# Patient Record
Sex: FEMALE | Race: BLACK OR AFRICAN AMERICAN | NOT HISPANIC OR LATINO | Employment: UNEMPLOYED | URBAN - METROPOLITAN AREA
[De-identification: names, ages, dates, MRNs, and addresses within clinical notes are randomized per-mention and may not be internally consistent; named-entity substitution may affect disease eponyms.]

---

## 2024-04-11 DIAGNOSIS — I10 PRIMARY HYPERTENSION: Primary | ICD-10-CM

## 2024-04-12 RX ORDER — AMLODIPINE BESYLATE 5 MG/1
5 TABLET ORAL DAILY
Qty: 30 TABLET | Refills: 0 | Status: SHIPPED | OUTPATIENT
Start: 2024-04-12 | End: 2024-05-07

## 2024-05-06 DIAGNOSIS — I10 PRIMARY HYPERTENSION: ICD-10-CM

## 2024-05-07 RX ORDER — AMLODIPINE BESYLATE 5 MG/1
5 TABLET ORAL DAILY
Qty: 30 TABLET | Refills: 0 | Status: SHIPPED | OUTPATIENT
Start: 2024-05-07 | End: 2024-05-22

## 2024-05-21 DIAGNOSIS — I10 PRIMARY HYPERTENSION: ICD-10-CM

## 2024-05-22 RX ORDER — AMLODIPINE BESYLATE 5 MG/1
5 TABLET ORAL DAILY
Qty: 90 TABLET | Refills: 0 | Status: SHIPPED | OUTPATIENT
Start: 2024-05-22

## 2024-08-22 DIAGNOSIS — I10 PRIMARY HYPERTENSION: ICD-10-CM

## 2024-08-22 RX ORDER — AMLODIPINE BESYLATE 5 MG/1
5 TABLET ORAL DAILY
Qty: 30 TABLET | Refills: 0 | Status: SHIPPED | OUTPATIENT
Start: 2024-08-22 | End: 2024-08-23

## 2024-08-22 ASSESSMENT — PROMIS GLOBAL HEALTH SCALE
CARRYOUT_SOCIAL_ACTIVITIES: GOOD
RATE_SOCIAL_SATISFACTION: GOOD
RATE_PHYSICAL_HEALTH: POOR
RATE_AVERAGE_PAIN: 2
CARRYOUT_PHYSICAL_ACTIVITIES: MODERATELY
RATE_MENTAL_HEALTH: VERY GOOD
RATE_GENERAL_HEALTH: FAIR
EMOTIONAL_PROBLEMS: RARELY
RATE_AVERAGE_FATIGUE: MODERATE
RATE_QUALITY_OF_LIFE: GOOD

## 2024-08-23 ENCOUNTER — APPOINTMENT (OUTPATIENT)
Dept: PRIMARY CARE | Facility: CLINIC | Age: 39
End: 2024-08-23
Payer: COMMERCIAL

## 2024-08-23 VITALS
WEIGHT: 178 LBS | OXYGEN SATURATION: 98 % | HEART RATE: 108 BPM | DIASTOLIC BLOOD PRESSURE: 96 MMHG | BODY MASS INDEX: 28.61 KG/M2 | HEIGHT: 66 IN | SYSTOLIC BLOOD PRESSURE: 120 MMHG

## 2024-08-23 DIAGNOSIS — Z00.00 ANNUAL PHYSICAL EXAM: Primary | ICD-10-CM

## 2024-08-23 DIAGNOSIS — I10 PRIMARY HYPERTENSION: ICD-10-CM

## 2024-08-23 DIAGNOSIS — E78.5 HYPERLIPIDEMIA, UNSPECIFIED HYPERLIPIDEMIA TYPE: ICD-10-CM

## 2024-08-23 PROCEDURE — 1036F TOBACCO NON-USER: CPT | Performed by: SPECIALIST

## 2024-08-23 PROCEDURE — 3074F SYST BP LT 130 MM HG: CPT | Performed by: SPECIALIST

## 2024-08-23 PROCEDURE — 99395 PREV VISIT EST AGE 18-39: CPT | Performed by: SPECIALIST

## 2024-08-23 PROCEDURE — 3008F BODY MASS INDEX DOCD: CPT | Performed by: SPECIALIST

## 2024-08-23 PROCEDURE — 3080F DIAST BP >= 90 MM HG: CPT | Performed by: SPECIALIST

## 2024-08-23 RX ORDER — BUTYROSPERMUM PARKII(SHEA BUTTER), SIMMONDSIA CHINENSIS (JOJOBA) SEED OIL, ALOE BARBADENSIS LEAF EXTRACT .01; 1; 3.5 G/100G; G/100G; G/100G
250 LIQUID TOPICAL DAILY
COMMUNITY

## 2024-08-23 RX ORDER — BISMUTH SUBSALICYLATE 262 MG
1 TABLET,CHEWABLE ORAL DAILY
COMMUNITY

## 2024-08-23 RX ORDER — AMLODIPINE BESYLATE 5 MG/1
5 TABLET ORAL DAILY
Qty: 90 TABLET | Refills: 1 | Status: SHIPPED | OUTPATIENT
Start: 2024-08-23

## 2024-08-23 RX ORDER — AMLODIPINE BESYLATE 2.5 MG/1
2.5 TABLET ORAL DAILY
Qty: 90 TABLET | Refills: 1 | Status: SHIPPED | OUTPATIENT
Start: 2024-08-23 | End: 2025-02-19

## 2024-08-23 RX ORDER — DEXTROMETHORPHAN HYDROBROMIDE, GUAIFENESIN 5; 100 MG/5ML; MG/5ML
650 LIQUID ORAL EVERY 8 HOURS PRN
COMMUNITY

## 2024-08-23 RX ORDER — AMLODIPINE BESYLATE 5 MG/1
5 TABLET ORAL DAILY
Qty: 30 TABLET | Refills: 0 | Status: SHIPPED | OUTPATIENT
Start: 2024-08-23 | End: 2024-08-23 | Stop reason: SDUPTHER

## 2024-08-23 NOTE — PROGRESS NOTES
"Subjective   Patient ID: Bee Ndiaye is a 38 y.o. female who presents for Annual Exam.  HPI    37 yo left-handed female Pmhx sig for HTN, Fam Hx Htn and fam hx Sjogrens (Mother) here today for CPE    Not following DASH diet   Eating not as healthy    some joint pain right foot using compression sock \"soreness when she walks\"    No red/hot swollen or stiffnes >30 mins morning  Left wrist bothered her 2 yrs ago used brace now fine    No Known Allergies   Current Outpatient Medications   Medication Instructions    acetaminophen (TYLENOL 8 HOUR) 650 mg, oral, Every 8 hours PRN, Do not crush, chew, or split.    amLODIPine (NORVASC) 2.5 mg, oral, Daily, To be taken along with amlodipine 5 mg daily for total daily dose of 5 mg    amLODIPine (NORVASC) 5 mg, oral, Daily, To be taken along with 2.5 mg daily for total daily dose of 7.5 mg    multivitamin tablet 1 tablet, oral, Daily    saccharomyces boulardii (FLORASTOR) 250 mg, oral, Daily        Review of Systems  Constitutional  No fatigue, no fevers, no chills, no unintentional weight loss  HEENT:  No headaches, no dizziness, no double vision, no blurred vision, eye exams current (glasses for driving is nearsighted) no hearing loss  Cardiovascular:  No chest pain, no palpitations, no shortness of breath with exertion (one flight of stairs),   Respiratory:  No cough, no hemoptysis, no wheezing, No shortness of breath at rest  GI:  No dysphagia, no odynophagia, no reflux, no abdominal pain, no nausea, no vomiting, no changes in bowel habits, no bright red blood per rectum, no melena  :  No urinary frequency, no dysuria, no urine incontinence  MSK:  No falls, Over past year some joint pain right foot using compression sock \"soreness when she walks\"    pain, no joint swelling  Neuro:  No tremors, no extremity weakness, no changes in sensation  Breasts:  No abnormalities on self breast exam no nipple discharge no skin changes    Physical Exam  BP (!) 120/96   Pulse 108   Ht " "1.676 m (5' 6\")   Wt 80.7 kg (178 lb)   SpO2 98%   BMI 28.73 kg/m²   General:    Well-appearing  F in no acute distress, well nourished, well hydrated  Head:  Normocephalic, atraumatic  Skin:          Warm dry,   Eyes:  Anicteric sclera, pupils equal,   Ears:        TMs intact on right, left obscured by cerumen  Oral:      Not examined due to pandemic  Neck:   Supple, no cervical/supraclavicular adenopathy, no thyromegaly or nodules appreciated on exam  Cor:      Regular rate, normal S1, S2, no murmurs appreciated, no S3, no S4   Lungs:   Clear to auscultation b/l, no wheezes, no rhonchi, no crackles, no accessory respiratory muscle use  Abd:          Soft, nontender, no guarding, no rebound, no hepatosplenomegaly appreciated   Ext:            No lower extremity edema, no palpable cords  Pulses:      Pedal pulses intact  Neuro:   CN2-12 grossly intact (except funduscopic exam not performed)  Breasts:     No Axillary adenopathy, no discrete palpable breast nodules, no overlying skin changes, no nipple discharge    Assessment/Plan     Annual Physical Exam  -Recommend annual influenza vaccine in fall  -Recommend covid vaccine in fall (2 prior covid vaccines, no hx Covid)  -Prior Tdap 1/25/2023  -Recommend annual gynecology exam (last pap 2018)  -Plan Colon cancer screening age 45 absent changes  -BMI 28   -Labs ordered CMP CBC Fx Lipids    Htn  -Suboptimally controlled on 5 mg amlodipine  -BP Today 130/00 on Left and 120/96 on right  -Home bp 136/95  -Will increase amlodipine to 7.5 mg daily  -Recommend home BP monitoring  -If diastolic remains elevated above 90, will need to consider diuretic, discussed    Hyperlipidemia  - last year  -Recommend heart healthy diet/exercise as tolerated  -Labs ordered    F/up 2-3 mos due to htn         Karissa Mitchell DO   "

## 2024-08-26 RX ORDER — AMLODIPINE BESYLATE 5 MG/1
5 TABLET ORAL DAILY
Qty: 30 TABLET | Refills: 0 | OUTPATIENT
Start: 2024-08-26

## 2024-12-06 ENCOUNTER — APPOINTMENT (OUTPATIENT)
Dept: PRIMARY CARE | Facility: CLINIC | Age: 39
End: 2024-12-06
Payer: COMMERCIAL

## 2024-12-09 ENCOUNTER — APPOINTMENT (OUTPATIENT)
Dept: OBSTETRICS AND GYNECOLOGY | Facility: CLINIC | Age: 39
End: 2024-12-09
Payer: COMMERCIAL

## 2024-12-09 VITALS
WEIGHT: 174 LBS | HEIGHT: 66 IN | SYSTOLIC BLOOD PRESSURE: 129 MMHG | DIASTOLIC BLOOD PRESSURE: 83 MMHG | BODY MASS INDEX: 27.97 KG/M2

## 2024-12-09 DIAGNOSIS — Z01.419 NORMAL GYNECOLOGIC EXAMINATION: Primary | ICD-10-CM

## 2024-12-09 PROCEDURE — 3074F SYST BP LT 130 MM HG: CPT | Performed by: OBSTETRICS & GYNECOLOGY

## 2024-12-09 PROCEDURE — 3079F DIAST BP 80-89 MM HG: CPT | Performed by: OBSTETRICS & GYNECOLOGY

## 2024-12-09 PROCEDURE — 99395 PREV VISIT EST AGE 18-39: CPT | Performed by: OBSTETRICS & GYNECOLOGY

## 2024-12-09 PROCEDURE — 88175 CYTOPATH C/V AUTO FLUID REDO: CPT

## 2024-12-09 PROCEDURE — 1036F TOBACCO NON-USER: CPT | Performed by: OBSTETRICS & GYNECOLOGY

## 2024-12-09 PROCEDURE — 3008F BODY MASS INDEX DOCD: CPT | Performed by: OBSTETRICS & GYNECOLOGY

## 2024-12-09 PROCEDURE — 87624 HPV HI-RISK TYP POOLED RSLT: CPT

## 2024-12-09 ASSESSMENT — PATIENT HEALTH QUESTIONNAIRE - PHQ9
SUM OF ALL RESPONSES TO PHQ9 QUESTIONS 1 & 2: 0
2. FEELING DOWN, DEPRESSED OR HOPELESS: NOT AT ALL
1. LITTLE INTEREST OR PLEASURE IN DOING THINGS: NOT AT ALL

## 2024-12-09 ASSESSMENT — ENCOUNTER SYMPTOMS
CARDIOVASCULAR NEGATIVE: 1
GASTROINTESTINAL NEGATIVE: 1
EYES NEGATIVE: 1
NEUROLOGICAL NEGATIVE: 1
HEMATOLOGIC/LYMPHATIC NEGATIVE: 1
CONSTITUTIONAL NEGATIVE: 1
ALLERGIC/IMMUNOLOGIC NEGATIVE: 1
PSYCHIATRIC NEGATIVE: 1
RESPIRATORY NEGATIVE: 1
ENDOCRINE NEGATIVE: 1
MUSCULOSKELETAL NEGATIVE: 1

## 2024-12-09 ASSESSMENT — PAIN SCALES - GENERAL: PAINLEVEL_OUTOF10: 0-NO PAIN

## 2024-12-09 NOTE — PROGRESS NOTES
Subjective   Patient ID: Bee Ndiaye is a 39 y.o. female who presents for Annual Exam (Last pap:  10/9/2018  neg/neg./Declines chaperone.   Daija Barriga LPN).  HPI patient is here for annual visit she has no complaints    Review of Systems   Constitutional: Negative.    Eyes: Negative.    Respiratory: Negative.     Cardiovascular: Negative.    Gastrointestinal: Negative.    Endocrine: Negative.    Genitourinary: Negative.    Musculoskeletal: Negative.    Skin: Negative.    Allergic/Immunologic: Negative.    Neurological: Negative.    Hematological: Negative.    Psychiatric/Behavioral: Negative.         Objective   Physical Exam  Constitutional:       Appearance: Normal appearance.   HENT:      Head: Normocephalic and atraumatic.   Cardiovascular:      Rate and Rhythm: Normal rate and regular rhythm.      Pulses: Normal pulses.      Heart sounds: Normal heart sounds.   Pulmonary:      Effort: Pulmonary effort is normal.      Breath sounds: Normal breath sounds.   Abdominal:      General: Abdomen is flat. Bowel sounds are normal.      Palpations: Abdomen is soft.      Hernia: There is no hernia in the left inguinal area or right inguinal area.   Genitourinary:     General: Normal vulva.      Exam position: Lithotomy position.      Labia:         Right: No rash, tenderness or lesion.         Left: No rash, tenderness or lesion.       Urethra: No prolapse.      Vagina: Normal.      Cervix: Normal.      Uterus: Normal.       Adnexa: Right adnexa normal and left adnexa normal.   Musculoskeletal:      Cervical back: Normal range of motion and neck supple.   Skin:     General: Skin is warm and dry.   Neurological:      General: No focal deficit present.      Mental Status: She is alert and oriented to person, place, and time.         Assessment/Plan    normal gynecologic examination  Pap test HPV typing         Truong Duncan MD 12/09/24 9:26 AM

## 2024-12-19 LAB
CYTOLOGY CMNT CVX/VAG CYTO-IMP: NORMAL
HPV HR 12 DNA GENITAL QL NAA+PROBE: NEGATIVE
HPV HR GENOTYPES PNL CVX NAA+PROBE: NEGATIVE
HPV16 DNA SPEC QL NAA+PROBE: NEGATIVE
HPV18 DNA SPEC QL NAA+PROBE: NEGATIVE
LAB AP CONTRACEPTIVE HISTORY: NORMAL
LAB AP HPV GENOTYPE QUESTION: YES
LAB AP HPV HR: NORMAL
LABORATORY COMMENT REPORT: NORMAL
LMP START DATE: NORMAL
PATH REPORT.TOTAL CANCER: NORMAL

## 2025-02-10 ENCOUNTER — APPOINTMENT (OUTPATIENT)
Dept: OBSTETRICS AND GYNECOLOGY | Facility: CLINIC | Age: 40
End: 2025-02-10
Payer: COMMERCIAL

## 2025-02-10 VITALS
WEIGHT: 174.2 LBS | HEIGHT: 66 IN | BODY MASS INDEX: 28 KG/M2 | DIASTOLIC BLOOD PRESSURE: 90 MMHG | SYSTOLIC BLOOD PRESSURE: 142 MMHG

## 2025-02-10 DIAGNOSIS — N76.0 ACUTE VAGINITIS: Primary | ICD-10-CM

## 2025-02-10 DIAGNOSIS — Z97.5 IUD (INTRAUTERINE DEVICE) IN PLACE: ICD-10-CM

## 2025-02-10 PROCEDURE — 3077F SYST BP >= 140 MM HG: CPT | Performed by: ADVANCED PRACTICE MIDWIFE

## 2025-02-10 PROCEDURE — 99213 OFFICE O/P EST LOW 20 MIN: CPT | Performed by: ADVANCED PRACTICE MIDWIFE

## 2025-02-10 PROCEDURE — 3008F BODY MASS INDEX DOCD: CPT | Performed by: ADVANCED PRACTICE MIDWIFE

## 2025-02-10 PROCEDURE — 3080F DIAST BP >= 90 MM HG: CPT | Performed by: ADVANCED PRACTICE MIDWIFE

## 2025-02-10 PROCEDURE — 1036F TOBACCO NON-USER: CPT | Performed by: ADVANCED PRACTICE MIDWIFE

## 2025-02-10 RX ORDER — METRONIDAZOLE 500 MG/1
500 TABLET ORAL 2 TIMES DAILY
Qty: 14 TABLET | Refills: 0 | Status: SHIPPED | OUTPATIENT
Start: 2025-02-10 | End: 2025-02-17

## 2025-02-10 RX ORDER — TERCONAZOLE 8 MG/G
1 CREAM VAGINAL NIGHTLY
Qty: 20 G | Refills: 0 | Status: SHIPPED | OUTPATIENT
Start: 2025-02-10 | End: 2025-02-13

## 2025-02-10 ASSESSMENT — ENCOUNTER SYMPTOMS
DEPRESSION: 0
MUSCULOSKELETAL NEGATIVE: 0
GASTROINTESTINAL NEGATIVE: 0
NEUROLOGICAL NEGATIVE: 0
OCCASIONAL FEELINGS OF UNSTEADINESS: 0
HEMATOLOGIC/LYMPHATIC NEGATIVE: 0
ALLERGIC/IMMUNOLOGIC NEGATIVE: 0
RESPIRATORY NEGATIVE: 0
PSYCHIATRIC NEGATIVE: 0
CONSTITUTIONAL NEGATIVE: 0
ENDOCRINE NEGATIVE: 0
CARDIOVASCULAR NEGATIVE: 0
EYES NEGATIVE: 0
LOSS OF SENSATION IN FEET: 0

## 2025-02-10 ASSESSMENT — PAIN SCALES - GENERAL: PAINLEVEL_OUTOF10: 0-NO PAIN

## 2025-02-10 NOTE — PROGRESS NOTES
Subjective   Patient ID: Bee Ndiaye is a 39 y.o. female who presents for Vaginitis/Bacterial Vaginosis.  HPI  Vaginal itching  Noticed it last Monday  Took monistat 7day course without any relief  Feels about the same    IUD    Review of Systems   Genitourinary:         Vaginal itching   All other systems reviewed and are negative.      Objective   Physical Exam  Constitutional:       Appearance: Normal appearance. She is normal weight.   Genitourinary:     General: Normal vulva.      Labia:         Right: No rash, tenderness, lesion or injury.         Left: No rash, tenderness, lesion or injury.       Vagina: Bleeding present.      Cervix: Cervical bleeding present. No cervical motion tenderness, discharge, friability, lesion, erythema or eversion.      Uterus: Not deviated, not enlarged, not fixed, not tender and no uterine prolapse.       Comments: On menses  Strings visible in os        Assessment/Plan   Problem List Items Addressed This Visit             ICD-10-CM    IUD (intrauterine device) in place Z97.5     Other Visit Diagnoses         Codes    Acute vaginitis    -  Primary N76.0    Relevant Medications    terconazole (Terazol 3) 0.8 % vaginal cream    metroNIDAZOLE (Flagyl) 500 mg tablet    Other Relevant Orders    Vaginitis Gram Stain For Bacterial Vaginosis + Yeast                 VANDA Medina 02/10/25 12:36 PM

## 2025-02-10 NOTE — PATIENT INSTRUCTIONS
Improving your microbiome--the collection of bacteria, fungi, viruses, and other microbes living in and on your body--can have a significant positive impact on your overall health, including digestion, immune function, mental health, and skin health. Here are some general instructions you can follow to improve the health of your microbiome:    1. Eat a Diverse Diet  A varied diet supports a diverse microbiome. The more different types of foods you eat, the more types of beneficial microbes you can support. Focus on:    Fiber-rich foods: Vegetables, fruits, whole grains, legumes, and nuts. Fiber acts as food for the beneficial bacteria.  Fermented foods: Yogurt, kefir, sauerkraut, kimchi, miso, kombucha, and other fermented foods contain live beneficial bacteria that can help boost your gut health.  Prebiotic foods: These foods, such as onions, garlic, leeks, asparagus, bananas, and oats, provide food for the good bacteria in your gut.  Polyphenol-rich foods: Berries, dark chocolate, green tea, and nuts are rich in polyphenols, which are plant compounds that feed beneficial gut bacteria.    2. Include Probiotic Supplements  Probiotics are live bacteria that can help replenish beneficial microbes in the gut. While probiotics are found in foods like yogurt and fermented foods, you can also take probiotic supplements, especially if you're recovering from an illness, taking antibiotics, or dealing with a gut imbalance.  Consult with your healthcare provider to determine the right type and dosage for your needs.    3. Minimize Use of Antibiotics (When Possible)  Antibiotics are effective for treating bacterial infections, but they also kill beneficial bacteria in the gut. Overuse or misuse of antibiotics can disrupt the balance of your microbiome. Only use antibiotics when prescribed by a doctor and always finish the full course to avoid resistance.    4. Limit Sugar and Artificial Sweeteners  High sugar consumption and  artificial sweeteners can negatively affect the diversity of your microbiome and promote the growth of harmful bacteria. Try to limit your intake of sugary foods and drinks, as well as processed foods that may contain artificial sweeteners.    5. Avoid Smoking and Limit Alcohol  Smoking: Tobacco can harm the balance of bacteria in your gut, reducing diversity and promoting harmful bacteria. Quitting smoking is one of the best ways to improve your microbiome.  Alcohol: Excessive alcohol intake can disrupt the gut microbiota and lead to inflammation. If you drink, do so in moderation.    6. Exercise Regularly  Regular physical activity is linked to a more diverse and healthy microbiome. Even moderate exercise, like walking or cycling, can promote the growth of beneficial bacteria. Aim for at least 150 minutes of moderate-intensity exercise per week.    7. Get Enough Sleep  Quality sleep is essential for overall health, including the health of your microbiome. Poor sleep patterns can disrupt the balance of gut bacteria, which may lead to gut-related health issues. Aim for 7-9 hours of sleep each night.  Practice good sleep hygiene by establishing a regular sleep schedule and creating a comfortable, dark, and quiet sleep environment.    8. Manage Stress  Chronic stress can alter the gut microbiome and contribute to gastrointestinal issues. Find ways to manage stress, such as:  Mindfulness meditation  Yoga  Breathing exercises  Spending time outdoors    9. Stay Hydrated  Drinking enough water helps maintain the mucosal lining of the intestines, which supports healthy gut function. Aim for 8 cups (about 2 liters) of water per day, but your needs may vary depending on activity levels and climate.    10. Avoid Overuse of Antibacterial Products  Antibacterial soaps, lotions, and other products can kill beneficial bacteria on your skin and potentially lead to an imbalance. It’s important to practice good hygiene without  over-relying on antibacterial products.    11. Consider Prebiotic and Probiotic Foods  Prebiotics: Found in foods like garlic, onions, leeks, and bananas, prebiotics support the growth of beneficial bacteria in your gut.  Probiotics: Consuming fermented foods such as yogurt, kimchi, and sauerkraut provides live cultures of beneficial bacteria.    12. Consult Your Healthcare Provider  If you have ongoing digestive problems, autoimmune conditions, or other health concerns, consider consulting with a healthcare provider, such as a nutritionist or gastroenterologist. They can help guide you in improving your gut health based on your unique situation.    By following these steps, you can support a balanced, diverse, and thriving microbiome, which in turn can enhance your overall health and wellbeing.

## 2025-02-11 LAB — BV SCORE VAG QL: NORMAL

## 2025-02-16 DIAGNOSIS — I10 PRIMARY HYPERTENSION: ICD-10-CM

## 2025-02-17 RX ORDER — AMLODIPINE BESYLATE 5 MG/1
TABLET ORAL
Qty: 90 TABLET | Refills: 0 | Status: SHIPPED | OUTPATIENT
Start: 2025-02-17

## 2025-02-17 RX ORDER — AMLODIPINE BESYLATE 2.5 MG/1
TABLET ORAL
Qty: 90 TABLET | Refills: 0 | Status: SHIPPED | OUTPATIENT
Start: 2025-02-17

## 2025-02-28 ENCOUNTER — APPOINTMENT (OUTPATIENT)
Dept: PRIMARY CARE | Facility: CLINIC | Age: 40
End: 2025-02-28
Payer: COMMERCIAL

## 2025-02-28 VITALS — HEART RATE: 90 BPM | OXYGEN SATURATION: 96 % | SYSTOLIC BLOOD PRESSURE: 122 MMHG | DIASTOLIC BLOOD PRESSURE: 90 MMHG

## 2025-02-28 DIAGNOSIS — E78.5 HYPERLIPIDEMIA, UNSPECIFIED HYPERLIPIDEMIA TYPE: ICD-10-CM

## 2025-02-28 DIAGNOSIS — R39.9 UTI SYMPTOMS: Primary | ICD-10-CM

## 2025-02-28 DIAGNOSIS — N64.4 BREAST PAIN: ICD-10-CM

## 2025-02-28 DIAGNOSIS — I10 ESSENTIAL HYPERTENSION: ICD-10-CM

## 2025-02-28 PROCEDURE — 3080F DIAST BP >= 90 MM HG: CPT | Performed by: SPECIALIST

## 2025-02-28 PROCEDURE — 99214 OFFICE O/P EST MOD 30 MIN: CPT | Performed by: SPECIALIST

## 2025-02-28 PROCEDURE — 3074F SYST BP LT 130 MM HG: CPT | Performed by: SPECIALIST

## 2025-02-28 RX ORDER — AMLODIPINE BESYLATE 10 MG/1
10 TABLET ORAL DAILY
Qty: 90 TABLET | Refills: 1 | Status: SHIPPED | OUTPATIENT
Start: 2025-02-28 | End: 2025-08-27

## 2025-02-28 NOTE — ASSESSMENT & PLAN NOTE
Currently taking 7.5 mg amlodipine  Does not want to take diuretic  Will increase amlodipine to 10 mg daily  Home bp this morning 123/89  /90 for me today   Follow-up in 4-6 months for bp check    Orders:    amLODIPine (Norvasc) 10 mg tablet; Take 1 tablet (10 mg) by mouth once daily.

## 2025-02-28 NOTE — PROGRESS NOTES
Subjective   Patient ID: Bee Ndiaye is a 39 y.o. female who presents for No chief complaint on file..  HPI    39 yo L Handed female Pmhx sig for Htn, Fam Hx Htn, Fam Hx Sjogrens (mother) presents today for f/up    Work related stress  Feeling anxious today, discussed    Saw ob/gyn regarding bacterial infection  Wasn't yeast infection  and not bacterial vaginosis    Thinks may have UTI, a little bit of frequency no burning with urination and urine is cloudy and bladder feels like she has to void but doesn't    Breast tenderness comes goes x 1 week both.  Last menses started normal cycle     No Known Allergies   Current Outpatient Medications   Medication Instructions    acetaminophen (TYLENOL 8 HOUR) 650 mg, Every 8 hours PRN    amLODIPine (NORVASC) 10 mg, oral, Daily    multivitamin tablet 1 tablet, Daily    saccharomyces boulardii (FLORASTOR) 250 mg, Daily        Review of Systems  Constitutional  No fatigue, no fevers, no chills, no unintentional weight loss,   HEENT:  No headaches, no dizziness,  Cardiovascular:  No chest pain, no palpitations, no shortness of breath with exertion (one flight of stairs),   Respiratory:  No cough, no hemoptysis, no wheezing, No shortness of breath at rest  GI:  No abdominal pain, no nausea, no vomiting, no changes in bowel habits, no bright red blood per rectum, no melena  :  Some urinary frequency, no dysuria, no urine incontinence nocturia 1-2 x night  MSK:  No falls, no joint pain, no joint swelling  Neuro:  No tremors, no extremity weakness, no changes in sensation  Breasts:  breasts feel tender x 1 weeks no abnormalities on self exam no nipple discharge no skin    Physical Exam  /90   Pulse 90   LMP  (LMP Unknown)   SpO2 96%  BP HR  General:    Well-appearing  F in no acute distress, well nourished, well hydrated  Head:  Normocephalic, atraumatic  Skin:          Warm dry,   Eyes:  Anicteric sclera, pupils equal,   Oral:      Not examined due to pandemic  Neck:    Supple,  Cor:      Regular rate, normal S1, S2, no murmurs appreciated, no S3, no S4  HR 96 by auscultation  Lungs:   Clear to auscultation b/l, no wheezes, no rhonchi, no crackles, no accessory respiratory muscle use   Abd:          Soft, nontender, no suprapubic tenderness No CVA tenderness to percussion  Breasts:     Declined chaperone, No Axillary adenopathy, no discrete palpable breast nodules, no overlying skin changes, no nipple discharge    Assessment/Plan   Assessment & Plan  UTI symptoms  Urine Analysis with reflex micro and culture ordered  Has Merina IUD  Orders:    Urinalysis with Reflex Culture and Microscopic; Future    Essential hypertension  Currently taking 7.5 mg amlodipine  Does not want to take diuretic  Will increase amlodipine to 10 mg daily  Home bp this morning 123/89  /90 for me today   Follow-up in 4-6 months for bp check    Orders:    amLODIPine (Norvasc) 10 mg tablet; Take 1 tablet (10 mg) by mouth once daily.    Hyperlipidemia, unspecified hyperlipidemia type   1/5/2023  Continue heart healthy diet/exercise as tolerated  Recheck at annual exam       Breast pain  Breast tenderness  Fam hx breast cancer in 2 Maternal aunts  Orders:    BI mammo bilateral diagnostic tomosynthesis; Future    hCG, Urine, Qualitative; Future       Karissa Mitchell DO

## 2025-03-01 LAB
APPEARANCE UR: ABNORMAL
BACTERIA #/AREA URNS HPF: ABNORMAL /HPF
BACTERIA UR CULT: ABNORMAL
BILIRUB UR QL STRIP: NEGATIVE
COLOR UR: YELLOW
GLUCOSE UR QL STRIP: NEGATIVE
HGB UR QL STRIP: NEGATIVE
HYALINE CASTS #/AREA URNS LPF: ABNORMAL /LPF
KETONES UR QL STRIP: NEGATIVE
LEUKOCYTE ESTERASE UR QL STRIP: ABNORMAL
NITRITE UR QL STRIP: NEGATIVE
PH UR STRIP: 7.5 [PH] (ref 5–8)
PROT UR QL STRIP: NEGATIVE
RBC #/AREA URNS HPF: ABNORMAL /HPF
SERVICE CMNT-IMP: ABNORMAL
SP GR UR STRIP: 1.02 (ref 1–1.03)
SQUAMOUS #/AREA URNS HPF: ABNORMAL /HPF
WBC #/AREA URNS HPF: ABNORMAL /HPF

## 2025-03-06 DIAGNOSIS — R39.9 UTI SYMPTOMS: Primary | ICD-10-CM

## 2025-03-13 ENCOUNTER — APPOINTMENT (OUTPATIENT)
Dept: OBSTETRICS AND GYNECOLOGY | Facility: CLINIC | Age: 40
End: 2025-03-13
Payer: COMMERCIAL

## 2025-03-20 ENCOUNTER — APPOINTMENT (OUTPATIENT)
Dept: OBSTETRICS AND GYNECOLOGY | Facility: CLINIC | Age: 40
End: 2025-03-20
Payer: COMMERCIAL

## 2025-03-25 PROBLEM — N64.4 BREAST TENDERNESS: Status: ACTIVE | Noted: 2025-03-25

## 2025-03-26 ENCOUNTER — APPOINTMENT (OUTPATIENT)
Dept: OBSTETRICS AND GYNECOLOGY | Facility: CLINIC | Age: 40
End: 2025-03-26
Payer: COMMERCIAL

## 2025-04-15 ENCOUNTER — OFFICE VISIT (OUTPATIENT)
Dept: OBSTETRICS AND GYNECOLOGY | Facility: CLINIC | Age: 40
End: 2025-04-15
Payer: COMMERCIAL

## 2025-04-15 VITALS
WEIGHT: 176.8 LBS | HEART RATE: 125 BPM | BODY MASS INDEX: 28.42 KG/M2 | HEIGHT: 66 IN | DIASTOLIC BLOOD PRESSURE: 93 MMHG | SYSTOLIC BLOOD PRESSURE: 145 MMHG

## 2025-04-15 DIAGNOSIS — N32.81 OVERACTIVE BLADDER: Primary | ICD-10-CM

## 2025-04-15 DIAGNOSIS — R39.9 UTI SYMPTOMS: ICD-10-CM

## 2025-04-15 LAB
POC APPEARANCE, URINE: CLEAR
POC BILIRUBIN, URINE: NEGATIVE
POC BLOOD, URINE: ABNORMAL
POC COLOR, URINE: YELLOW
POC GLUCOSE, URINE: NEGATIVE MG/DL
POC KETONES, URINE: NEGATIVE MG/DL
POC LEUKOCYTES, URINE: ABNORMAL
POC NITRITE,URINE: NEGATIVE
POC PH, URINE: 6.5 PH
POC PROTEIN, URINE: ABNORMAL MG/DL
POC SPECIFIC GRAVITY, URINE: 1.02
POC UROBILINOGEN, URINE: 0.2 EU/DL

## 2025-04-15 PROCEDURE — 3008F BODY MASS INDEX DOCD: CPT | Performed by: NURSE PRACTITIONER

## 2025-04-15 PROCEDURE — 81003 URINALYSIS AUTO W/O SCOPE: CPT | Mod: QW | Performed by: NURSE PRACTITIONER

## 2025-04-15 PROCEDURE — 99213 OFFICE O/P EST LOW 20 MIN: CPT | Performed by: NURSE PRACTITIONER

## 2025-04-15 PROCEDURE — 3077F SYST BP >= 140 MM HG: CPT | Performed by: NURSE PRACTITIONER

## 2025-04-15 PROCEDURE — 3080F DIAST BP >= 90 MM HG: CPT | Performed by: NURSE PRACTITIONER

## 2025-04-15 PROCEDURE — 99213 OFFICE O/P EST LOW 20 MIN: CPT | Mod: 25 | Performed by: NURSE PRACTITIONER

## 2025-04-15 PROCEDURE — 51798 US URINE CAPACITY MEASURE: CPT | Performed by: NURSE PRACTITIONER

## 2025-04-15 ASSESSMENT — ENCOUNTER SYMPTOMS
EYES NEGATIVE: 1
NEUROLOGICAL NEGATIVE: 1
ALLERGIC/IMMUNOLOGIC NEGATIVE: 1
MUSCULOSKELETAL NEGATIVE: 1
GASTROINTESTINAL NEGATIVE: 1
PSYCHIATRIC NEGATIVE: 1
ENDOCRINE NEGATIVE: 1
CONSTITUTIONAL NEGATIVE: 1
CARDIOVASCULAR NEGATIVE: 1
HEMATOLOGIC/LYMPHATIC NEGATIVE: 1
RESPIRATORY NEGATIVE: 1

## 2025-04-15 ASSESSMENT — PAIN SCALES - GENERAL: PAINLEVEL_OUTOF10: 0-NO PAIN

## 2025-04-15 NOTE — PROGRESS NOTES
Referring Physician: Karissa Mitchell DO     General medical background: None    Obstetric/Gynecologic History:  Bee Ndiaye has a history of : 3. Para: 1. Patient denies pain with intercourse.     Past Evaluation and Treatment::  Past evaluation includes: urinalysis  Past treatment includes: This problem has not been previously treated    Review of Systems   Constitutional: Negative.    HENT: Negative.     Eyes: Negative.    Respiratory: Negative.     Cardiovascular: Negative.    Gastrointestinal: Negative.    Endocrine: Negative.    Genitourinary: Negative.    Musculoskeletal: Negative.    Skin: Negative.    Allergic/Immunologic: Negative.    Neurological: Negative.    Hematological: Negative.    Psychiatric/Behavioral: Negative.          39 y.o. female with c/o UTI symptoms presents as a new patient. Reports sx suggestive of UTI since February. Initial BV tests were negative, but sx persisted. Sx include abdominal pain, urinary urgency and frequency, and nocturia 2-4 times per night. Notes that sx improved after her menstrual period in March, but have not resolved completely. Describes abdominal pain as soreness on her R lower side, which occurs while standing at work. Reports vaginal discharge, denies bulging. Occasional urinary urgency with low volume output. Denies changes in drinking habits, though she has increased her caffeine intake to twice per week d/t work stress. Currently sexually active, denies pain with intercourse. [place] does not exacerbate sx. Denies hx of kidney stones or gyn surgeries. Currently stressed d/t upcoming closure of the company where she works.     Record Review  - 2024 well woman exam with Dr. Duncan.   - 2025 vaginitis dx.       Testing results:  PVR results are available and reviewed  : 0 ml  UA results available and reviewed  Laboratory:   Urine dipstick shows negative.      History:  Urge Symptoms:   Do you feel like you don't empty your bladder completely? 2 -  Sometimes  How often do you urinate at night? 3 - Often  Score:  Vaginal Symptoms:   Do you experience pressure in the lower abdomen? 3 - Often  Do you experience heaviness or dullness in the pelvic area? 2 - Sometimes  Do you have a bulge or something falling out that you can see or feel in the vaginal area? 0 - Never  Score:  Rectal Symptoms:   Do you need to strain to have a bowel movement? 2 - Sometimes      Physical Exam  Constitutional:       Appearance: Normal appearance.   Genitourinary:      Vulva, bladder and urethral meatus normal.      Vulva exam comments: Normal labia.      No vaginal tenderness.      No vaginal prolapse present.     Vaginal exam comments: Normal discharge.        Right Adnexa: no mass present.     Left Adnexa: no mass present.     No cervical motion tenderness.      Uterus is not enlarged or fixed.      No urethral tenderness or stress urinary incontinence with cough stress test present.      Urethra exam comments: No diverticulum.      Bladder is not tender and urgency on palpation not present.       Levator ani not tender and obturator internus not tender.     Pelvic Floor comments: Tight pelvic floor, no pain.     Pelvic exam was performed with patient in the lithotomy position.   HENT:      Head: Normocephalic and atraumatic.   Neurological:      General: No focal deficit present.      Mental Status: She is alert and oriented to person, place, and time.   Psychiatric:         Mood and Affect: Mood normal.         Behavior: Behavior normal.         Thought Content: Thought content normal.         Judgment: Judgment normal.          Assessment and Plan  39 y.o. female with UTI symptoms. Comorbidities include: HTN, HLD.     Diagnosis List:  #1 UTI symptoms    Plan:  1. UTI symptoms  - POCT UA automated manually resulted.  - Post void residual measured.   - Requisition sent for urine culture standing order. We encouraged her to leave a urine sample at any  lab when she feels  symptomatic of a UTI in the future so we can result it and treat her accordingly.  - We discussed that PFPT may help with bladder/pelvic floor restrengthening exercises with an overall goal to better control the bladder and improve urinary symptoms. PFPT includes attending sessions with a specialized licensed female pelvic floor physical therapist who does external with internal vaginal work to ensure she is doing the correct exercises to obtain the most benefit of physical therapy. We reviewed the importance of continuing these home exercises to receive maximum benefit from PFPT. They also teach mind over bladder strategies/urge suppression techniques to reduce the intensity of the urge to void.   - We discussed that with starting an OAB medication the goal would be to allow the detrusor muscle around the bladder to relax and prevent the muscles from spasm/squeezing too frequently to allow the bladder to store more urine which reduces the urge, frequency, and incontinent episodes.   - Patient declines PFPT and OAB medication at this time, prefers to continue monitoring symptoms.     Follow up in 3 months with JP Ward.      Scribe Attestation  By signing my name below, INataliia Scribe, attest that this documentation has been prepared under the direction and in the presence of JP Ward on 04/15/2025 at {Time; Restraints Applied:[id number]}.     JP Ward

## 2025-04-25 ENCOUNTER — APPOINTMENT (OUTPATIENT)
Facility: CLINIC | Age: 40
End: 2025-04-25
Payer: COMMERCIAL

## 2025-04-28 ENCOUNTER — APPOINTMENT (OUTPATIENT)
Dept: OBSTETRICS AND GYNECOLOGY | Facility: CLINIC | Age: 40
End: 2025-04-28
Payer: COMMERCIAL

## 2025-04-28 VITALS
BODY MASS INDEX: 29.32 KG/M2 | HEIGHT: 65 IN | WEIGHT: 176 LBS | SYSTOLIC BLOOD PRESSURE: 122 MMHG | DIASTOLIC BLOOD PRESSURE: 88 MMHG

## 2025-04-28 DIAGNOSIS — N89.8 VAGINAL DISCHARGE: Primary | ICD-10-CM

## 2025-04-28 PROCEDURE — 3008F BODY MASS INDEX DOCD: CPT

## 2025-04-28 PROCEDURE — 99213 OFFICE O/P EST LOW 20 MIN: CPT

## 2025-04-28 PROCEDURE — 3079F DIAST BP 80-89 MM HG: CPT

## 2025-04-28 PROCEDURE — 1036F TOBACCO NON-USER: CPT

## 2025-04-28 PROCEDURE — 3074F SYST BP LT 130 MM HG: CPT

## 2025-04-28 ASSESSMENT — ENCOUNTER SYMPTOMS
EYES NEGATIVE: 0
GASTROINTESTINAL NEGATIVE: 0
RESPIRATORY NEGATIVE: 0
CONSTITUTIONAL NEGATIVE: 0
NEUROLOGICAL NEGATIVE: 0
CARDIOVASCULAR NEGATIVE: 0
PSYCHIATRIC NEGATIVE: 0
ENDOCRINE NEGATIVE: 0
ALLERGIC/IMMUNOLOGIC NEGATIVE: 0
MUSCULOSKELETAL NEGATIVE: 0
HEMATOLOGIC/LYMPHATIC NEGATIVE: 0

## 2025-04-28 ASSESSMENT — PAIN SCALES - GENERAL: PAINLEVEL_OUTOF10: 0-NO PAIN

## 2025-04-28 NOTE — PROGRESS NOTES
"Subjective     39 y.o. female with c/o UTI symptoms and vaginal discharge and odor. Patient has been seen for this problem a few times in the last few months with all testing being negative.  Reports sx suggestive of UTI since February. Initial BV tests were negative, but sx persisted. Sx include abdominal pain, urinary urgency and frequency, and nocturia 2-4 times per night. Notes that sx improved after her menstrual period in March, but have not resolved completely. Describes abdominal pain as soreness on her R lower side, which occurs while standing at work. Reports vaginal discharge with odor.       Currently sexually active, denies pain with intercourse. [place] does not exacerbate sx.        Objective   /88   Ht 1.651 m (5' 5\")   Wt 79.8 kg (176 lb)   LMP 04/01/2025      General:   Alert and oriented x 3   Vulva: EGBUS normal   Vagina: Pink, normal discharge   Cervix: No CMT   Uterus: Normal shape, size   Adnexa: NT bilaterally     Assessment/Plan   Diagnoses and all orders for this visit:  Vaginal discharge  -     Ureaplasma/Mycoplasma Culture; Future  -     Urine culture  -     Trichomonas vaginalis, Amplified; Future  -     C. trachomatis / N. gonorrhoeae, Amplified, Urogenital; Future  -     Vaginitis Gram Stain For Bacterial Vaginosis + Yeast  FREYA Bland-ROGERM   "

## 2025-04-30 ENCOUNTER — APPOINTMENT (OUTPATIENT)
Dept: OBSTETRICS AND GYNECOLOGY | Facility: CLINIC | Age: 40
End: 2025-04-30
Payer: COMMERCIAL

## 2025-04-30 ENCOUNTER — TELEPHONE (OUTPATIENT)
Dept: OBSTETRICS AND GYNECOLOGY | Facility: CLINIC | Age: 40
End: 2025-04-30

## 2025-04-30 DIAGNOSIS — A59.9 TRICHOMONAS INFECTION: Primary | ICD-10-CM

## 2025-04-30 RX ORDER — METRONIDAZOLE 500 MG/1
500 TABLET ORAL 2 TIMES DAILY
Qty: 14 TABLET | Refills: 0 | Status: SHIPPED | OUTPATIENT
Start: 2025-04-30 | End: 2025-05-07

## 2025-04-30 NOTE — TELEPHONE ENCOUNTER
Left message for pt to return call.  Per Lashell Ford:  This patient has trich . I have placed an order for flagyl to her pharmacy. Can you let her know?

## 2025-05-01 ENCOUNTER — TELEPHONE (OUTPATIENT)
Dept: OBSTETRICS AND GYNECOLOGY | Facility: CLINIC | Age: 40
End: 2025-05-01
Payer: COMMERCIAL

## 2025-05-01 LAB
BACTERIA UR CULT: NORMAL
BV SCORE VAG QL: NORMAL
C TRACH RRNA SPEC QL NAA+PROBE: NOT DETECTED
M GENITALIUM RRNA SPEC QL NAA+PROBE: NOT DETECTED
M HOMINIS SPEC QL CULT: NORMAL
N GONORRHOEA RRNA SPEC QL NAA+PROBE: NOT DETECTED
QUEST GC CT AMPLIFIED (ALWAYS MESSAGE): NORMAL
SPECIMEN SOURCE: NORMAL
T VAGINALIS RRNA SPEC QL NAA+PROBE: DETECTED

## 2025-05-01 NOTE — TELEPHONE ENCOUNTER
Pt verified by name and .  Pt is aware of Lashell Veras's message:  This patient has trich . I have placed an order for flagyl to her pharmacy.   Pt is aware her partner needs to be treated, no intercourse 7-14 day after both people are treated.  Pt has no questions at this time.

## 2025-05-14 ENCOUNTER — TELEPHONE (OUTPATIENT)
Dept: OBSTETRICS AND GYNECOLOGY | Facility: CLINIC | Age: 40
End: 2025-05-14
Payer: COMMERCIAL

## 2025-05-14 DIAGNOSIS — R35.0 FREQUENT URINATION: Primary | ICD-10-CM

## 2025-05-14 NOTE — TELEPHONE ENCOUNTER
Pt notified that Lashell Ford placed an order for a urine culture and she can go to any [facility] to drop a urine off

## 2025-07-15 ENCOUNTER — APPOINTMENT (OUTPATIENT)
Dept: OBSTETRICS AND GYNECOLOGY | Facility: CLINIC | Age: 40
End: 2025-07-15
Payer: COMMERCIAL

## 2025-07-30 DIAGNOSIS — I10 ESSENTIAL HYPERTENSION: ICD-10-CM

## 2025-07-31 RX ORDER — AMLODIPINE BESYLATE 10 MG/1
10 TABLET ORAL DAILY
Qty: 90 TABLET | Refills: 1 | Status: SHIPPED | OUTPATIENT
Start: 2025-07-31 | End: 2026-01-27

## 2025-08-01 ENCOUNTER — APPOINTMENT (OUTPATIENT)
Dept: PRIMARY CARE | Facility: CLINIC | Age: 40
End: 2025-08-01
Payer: COMMERCIAL

## 2025-08-25 ENCOUNTER — APPOINTMENT (OUTPATIENT)
Dept: PRIMARY CARE | Facility: CLINIC | Age: 40
End: 2025-08-25
Payer: COMMERCIAL